# Patient Record
Sex: FEMALE | Race: WHITE | URBAN - METROPOLITAN AREA
[De-identification: names, ages, dates, MRNs, and addresses within clinical notes are randomized per-mention and may not be internally consistent; named-entity substitution may affect disease eponyms.]

---

## 2022-05-09 ENCOUNTER — APPOINTMENT (RX ONLY)
Dept: URBAN - METROPOLITAN AREA CLINIC 51 | Facility: CLINIC | Age: 22
Setting detail: DERMATOLOGY
End: 2022-05-09

## 2022-05-09 DIAGNOSIS — D22 MELANOCYTIC NEVI: ICD-10-CM | Status: STABLE

## 2022-05-09 DIAGNOSIS — L21.8 OTHER SEBORRHEIC DERMATITIS: ICD-10-CM | Status: STABLE

## 2022-05-09 DIAGNOSIS — L40.0 PSORIASIS VULGARIS: ICD-10-CM | Status: STABLE

## 2022-05-09 DIAGNOSIS — T07XXXA INSECT BITE, NONVENOMOUS, OF OTHER, MULTIPLE, AND UNSPECIFIED SITES, WITHOUT MENTION OF INFECTION: ICD-10-CM | Status: STABLE

## 2022-05-09 PROBLEM — D22.62 MELANOCYTIC NEVI OF LEFT UPPER LIMB, INCLUDING SHOULDER: Status: ACTIVE | Noted: 2022-05-09

## 2022-05-09 PROBLEM — L30.9 DERMATITIS, UNSPECIFIED: Status: ACTIVE | Noted: 2022-05-09

## 2022-05-09 PROBLEM — D22.61 MELANOCYTIC NEVI OF RIGHT UPPER LIMB, INCLUDING SHOULDER: Status: ACTIVE | Noted: 2022-05-09

## 2022-05-09 PROBLEM — S80.862A INSECT BITE (NONVENOMOUS), LEFT LOWER LEG, INITIAL ENCOUNTER: Status: ACTIVE | Noted: 2022-05-09

## 2022-05-09 PROCEDURE — 99203 OFFICE O/P NEW LOW 30 MIN: CPT

## 2022-05-09 PROCEDURE — ? TREATMENT REGIMEN

## 2022-05-09 PROCEDURE — ? ADDITIONAL NOTES

## 2022-05-09 PROCEDURE — ? COUNSELING

## 2022-05-09 ASSESSMENT — LOCATION DETAILED DESCRIPTION DERM
LOCATION DETAILED: LEFT ELBOW
LOCATION DETAILED: RIGHT POSTERIOR SHOULDER
LOCATION DETAILED: LEFT POSTERIOR SHOULDER
LOCATION DETAILED: RIGHT ELBOW
LOCATION DETAILED: LEFT PROXIMAL CALF
LOCATION DETAILED: HAIR

## 2022-05-09 ASSESSMENT — LOCATION ZONE DERM
LOCATION ZONE: SCALP
LOCATION ZONE: ARM
LOCATION ZONE: LEG

## 2022-05-09 ASSESSMENT — PGA PSORIASIS: PGA PSORIASIS 2020: ALMOST CLEAR

## 2022-05-09 ASSESSMENT — LOCATION SIMPLE DESCRIPTION DERM
LOCATION SIMPLE: RIGHT ELBOW
LOCATION SIMPLE: LEFT SHOULDER
LOCATION SIMPLE: RIGHT SHOULDER
LOCATION SIMPLE: LEFT CALF
LOCATION SIMPLE: HAIR
LOCATION SIMPLE: LEFT ELBOW

## 2022-05-09 ASSESSMENT — SEVERITY ASSESSMENT: HOW SEVERE IS THIS PATIENT'S CONDITION?: MILD

## 2022-05-09 NOTE — HPI: RASH
How Severe Is Your Rash?: mild
Is This A New Presentation, Or A Follow-Up?: Rash
Additional History: Patient states her mother has psoriasis

## 2022-05-09 NOTE — PROCEDURE: TREATMENT REGIMEN
Detail Level: Zone
Action 2: Continue
Samples Given: Eucerin rough & bumpy\\nCerave healing ointment

## 2022-05-09 NOTE — PROCEDURE: ADDITIONAL NOTES
Render Risk Assessment In Note?: no
Additional Notes: Recommended that patient come in when she is having a flare up. Patient has very little dry skin currently. She did show pictures that were a little worse than today, could be psoriasis. Her mom does have psoriasis. Advised her to follow up if she has a flare so that I can evaluate it.
Detail Level: Simple

## 2023-05-30 VITALS — HEIGHT: 64 IN | BODY MASS INDEX: 32.95 KG/M2 | WEIGHT: 193 LBS

## 2023-05-30 PROBLEM — O09.93 HIGH-RISK PREGNANCY IN THIRD TRIMESTER: Status: ACTIVE | Noted: 2023-05-30

## 2023-05-30 PROBLEM — J45.909 ASTHMA: Status: ACTIVE | Noted: 2023-01-09

## 2023-05-30 PROBLEM — O24.419 GESTATIONAL DIABETES MELLITUS (GDM) IN THIRD TRIMESTER: Status: ACTIVE | Noted: 2023-05-30

## 2023-05-30 PROBLEM — O99.213 OBESITY AFFECTING PREGNANCY IN THIRD TRIMESTER: Status: ACTIVE | Noted: 2023-05-30

## 2023-06-01 ENCOUNTER — HOSPITAL ENCOUNTER (EMERGENCY)
Age: 23
Discharge: HOME | End: 2023-06-01
Attending: STUDENT IN AN ORGANIZED HEALTH CARE EDUCATION/TRAINING PROGRAM | Admitting: OBSTETRICS & GYNECOLOGY
Payer: COMMERCIAL

## 2023-06-01 VITALS
BODY MASS INDEX: 33.12 KG/M2 | WEIGHT: 194 LBS | SYSTOLIC BLOOD PRESSURE: 120 MMHG | TEMPERATURE: 99 F | HEART RATE: 89 BPM | OXYGEN SATURATION: 97 % | HEIGHT: 64 IN | RESPIRATION RATE: 18 BRPM | DIASTOLIC BLOOD PRESSURE: 63 MMHG

## 2023-06-01 DIAGNOSIS — R00.2 PALPITATIONS: Primary | ICD-10-CM

## 2023-06-01 DIAGNOSIS — I49.3 PVC (PREMATURE VENTRICULAR CONTRACTION): ICD-10-CM

## 2023-06-01 LAB
ALBUMIN SERPL-MCNC: 2.5 G/DL (ref 3.5–5)
ALBUMIN/GLOB SERPL: 0.5 (ref 0.4–1.6)
ALP SERPL-CCNC: 121 U/L (ref 50–136)
ALT SERPL-CCNC: 29 U/L (ref 12–65)
ANION GAP SERPL CALC-SCNC: 8 MMOL/L (ref 2–11)
APPEARANCE UR: ABNORMAL
AST SERPL-CCNC: 23 U/L (ref 15–37)
BACTERIA URNS QL MICRO: ABNORMAL /HPF
BASOPHILS # BLD: 0 K/UL (ref 0–0.2)
BASOPHILS NFR BLD: 0 % (ref 0–2)
BILIRUB SERPL-MCNC: 0.2 MG/DL (ref 0.2–1.1)
BILIRUB UR QL: NEGATIVE
BUN SERPL-MCNC: 7 MG/DL (ref 6–23)
CALCIUM SERPL-MCNC: 8.7 MG/DL (ref 8.3–10.4)
CASTS URNS QL MICRO: ABNORMAL /LPF
CHLORIDE SERPL-SCNC: 107 MMOL/L (ref 101–110)
CO2 SERPL-SCNC: 24 MMOL/L (ref 21–32)
COLOR UR: ABNORMAL
CREAT SERPL-MCNC: 0.62 MG/DL (ref 0.6–1)
CREAT UR-MCNC: 77 MG/DL
DIFFERENTIAL METHOD BLD: ABNORMAL
EOSINOPHIL # BLD: 0.1 K/UL (ref 0–0.8)
EOSINOPHIL NFR BLD: 2 % (ref 0.5–7.8)
EPI CELLS #/AREA URNS HPF: ABNORMAL /HPF
ERYTHROCYTE [DISTWIDTH] IN BLOOD BY AUTOMATED COUNT: 12.6 % (ref 11.9–14.6)
GLOBULIN SER CALC-MCNC: 4.7 G/DL (ref 2.8–4.5)
GLUCOSE SERPL-MCNC: 95 MG/DL (ref 65–100)
GLUCOSE UR STRIP.AUTO-MCNC: NEGATIVE MG/DL
HCT VFR BLD AUTO: 31.4 % (ref 35.8–46.3)
HGB BLD-MCNC: 10.2 G/DL (ref 11.7–15.4)
HGB UR QL STRIP: NEGATIVE
IMM GRANULOCYTES # BLD AUTO: 0 K/UL (ref 0–0.5)
IMM GRANULOCYTES NFR BLD AUTO: 0 % (ref 0–5)
KETONES UR QL STRIP.AUTO: NEGATIVE MG/DL
LDH SERPL L TO P-CCNC: 191 U/L (ref 100–190)
LEUKOCYTE ESTERASE UR QL STRIP.AUTO: ABNORMAL
LYMPHOCYTES # BLD: 1.8 K/UL (ref 0.5–4.6)
LYMPHOCYTES NFR BLD: 23 % (ref 13–44)
MAGNESIUM SERPL-MCNC: 1.9 MG/DL (ref 1.8–2.4)
MCH RBC QN AUTO: 28.3 PG (ref 26.1–32.9)
MCHC RBC AUTO-ENTMCNC: 32.5 G/DL (ref 31.4–35)
MCV RBC AUTO: 87 FL (ref 82–102)
MONOCYTES # BLD: 0.5 K/UL (ref 0.1–1.3)
MONOCYTES NFR BLD: 6 % (ref 4–12)
NEUTS SEG # BLD: 5.4 K/UL (ref 1.7–8.2)
NEUTS SEG NFR BLD: 69 % (ref 43–78)
NITRITE UR QL STRIP.AUTO: NEGATIVE
NRBC # BLD: 0 K/UL (ref 0–0.2)
PH UR STRIP: 7.5 (ref 5–9)
PLATELET # BLD AUTO: 282 K/UL (ref 150–450)
PMV BLD AUTO: 10.1 FL (ref 9.4–12.3)
POTASSIUM SERPL-SCNC: 3.3 MMOL/L (ref 3.5–5.1)
PROT SERPL-MCNC: 7.2 G/DL (ref 6.3–8.2)
PROT UR STRIP-MCNC: NEGATIVE MG/DL
PROT UR-MCNC: 19 MG/DL
PROT/CREAT UR-RTO: 0.2
RBC # BLD AUTO: 3.61 M/UL (ref 4.05–5.2)
RBC #/AREA URNS HPF: ABNORMAL /HPF
SODIUM SERPL-SCNC: 139 MMOL/L (ref 133–143)
SP GR UR REFRACTOMETRY: 1.01 (ref 1–1.02)
TSH W FREE THYROID IF ABNORMAL: 0.86 UIU/ML (ref 0.36–3.74)
URATE SERPL-MCNC: 4.3 MG/DL (ref 2.6–6)
UROBILINOGEN UR QL STRIP.AUTO: 1 EU/DL (ref 0.2–1)
WBC # BLD AUTO: 7.9 K/UL (ref 4.3–11.1)
WBC URNS QL MICRO: ABNORMAL /HPF

## 2023-06-01 PROCEDURE — 84443 ASSAY THYROID STIM HORMONE: CPT

## 2023-06-01 PROCEDURE — 85025 COMPLETE CBC W/AUTO DIFF WBC: CPT

## 2023-06-01 PROCEDURE — 80053 COMPREHEN METABOLIC PANEL: CPT

## 2023-06-01 PROCEDURE — 59025 FETAL NON-STRESS TEST: CPT

## 2023-06-01 PROCEDURE — 99284 EMERGENCY DEPT VISIT MOD MDM: CPT

## 2023-06-01 PROCEDURE — 84156 ASSAY OF PROTEIN URINE: CPT

## 2023-06-01 PROCEDURE — 83735 ASSAY OF MAGNESIUM: CPT

## 2023-06-01 PROCEDURE — 82570 ASSAY OF URINE CREATININE: CPT

## 2023-06-01 PROCEDURE — 81001 URINALYSIS AUTO W/SCOPE: CPT

## 2023-06-01 PROCEDURE — 99283 EMERGENCY DEPT VISIT LOW MDM: CPT

## 2023-06-01 PROCEDURE — 93005 ELECTROCARDIOGRAM TRACING: CPT | Performed by: EMERGENCY MEDICINE

## 2023-06-01 PROCEDURE — 83615 LACTATE (LD) (LDH) ENZYME: CPT

## 2023-06-01 PROCEDURE — 84550 ASSAY OF BLOOD/URIC ACID: CPT

## 2023-06-01 ASSESSMENT — PAIN SCALES - GENERAL: PAINLEVEL_OUTOF10: 0

## 2023-06-01 ASSESSMENT — ENCOUNTER SYMPTOMS
CHOKING: 0
SHORTNESS OF BREATH: 0
CHEST TIGHTNESS: 0
COUGH: 0

## 2023-06-01 ASSESSMENT — PAIN - FUNCTIONAL ASSESSMENT: PAIN_FUNCTIONAL_ASSESSMENT: 0-10
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
## 2023-06-01 NOTE — ED PROVIDER NOTES
normal.         Thought Content: Thought content normal.        Procedures     Procedures    Orders Placed This Encounter   Procedures    Comprehensive Metabolic Panel    Protein / creatinine ratio, urine    Uric Acid    Lactate Dehydrogenase    Urinalysis w rflx microscopic    TSH with Reflex    Magnesium    CBC with Auto Differential    EKG 12 Lead        Medications - No data to display    Discharge Medication List as of 6/1/2023  8:00 PM           No past medical history on file. No past surgical history on file.      Social History     Socioeconomic History    Marital status: Single   Tobacco Use    Smoking status: Never    Smokeless tobacco: Never   Substance and Sexual Activity    Alcohol use: Not Currently    Drug use: Never        Discharge Medication List as of 6/1/2023  8:00 PM        CONTINUE these medications which have NOT CHANGED    Details   Prenatal Vit-Fe Fumarate-FA (PRENATAL 1+1 PO) PrenatalHistorical Med              Results for orders placed or performed during the hospital encounter of 06/01/23   Comprehensive Metabolic Panel   Result Value Ref Range    Sodium 139 133 - 143 mmol/L    Potassium 3.3 (L) 3.5 - 5.1 mmol/L    Chloride 107 101 - 110 mmol/L    CO2 24 21 - 32 mmol/L    Anion Gap 8 2 - 11 mmol/L    Glucose 95 65 - 100 mg/dL    BUN 7 6 - 23 MG/DL    Creatinine 0.62 0.6 - 1.0 MG/DL    Est, Glom Filt Rate >60 >60 ml/min/1.73m2    Calcium 8.7 8.3 - 10.4 MG/DL    Total Bilirubin 0.2 0.2 - 1.1 MG/DL    ALT 29 12 - 65 U/L    AST 23 15 - 37 U/L    Alk Phosphatase 121 50 - 136 U/L    Total Protein 7.2 6.3 - 8.2 g/dL    Albumin 2.5 (L) 3.5 - 5.0 g/dL    Globulin 4.7 (H) 2.8 - 4.5 g/dL    Albumin/Globulin Ratio 0.5 0.4 - 1.6     Protein / creatinine ratio, urine   Result Value Ref Range    Protein, Urine, Random 19 mg/dL    Creatinine, Ur 77.00 mg/dL    PROTEIN/CREAT RATIO URINE RAN 0.2     Uric Acid   Result Value Ref Range    Uric Acid 4.3 2.6 - 6.0 MG/DL   Lactate Dehydrogenase   Result

## 2023-06-01 NOTE — PROGRESS NOTES
Pt transferred to ED per MD order. Dr. Niesha Fletcher called ED triage and gave report. Transferred via wheelchair with ST Bean at side. Stable condition.
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
## 2023-06-02 LAB
EKG ATRIAL RATE: 71 BPM
EKG DIAGNOSIS: NORMAL
EKG P AXIS: 38 DEGREES
EKG P-R INTERVAL: 125 MS
EKG Q-T INTERVAL: 376 MS
EKG QRS DURATION: 114 MS
EKG QTC CALCULATION (BAZETT): 445 MS
EKG R AXIS: 84 DEGREES
EKG T AXIS: 35 DEGREES
EKG VENTRICULAR RATE: 84 BPM

## 2023-06-02 PROCEDURE — 93010 ELECTROCARDIOGRAM REPORT: CPT | Performed by: INTERNAL MEDICINE

## 2023-06-02 NOTE — ED NOTES
I have reviewed discharge instructions with the patient. The patient verbalized understanding. Patient left ED via Discharge Method: ambulatory to Home with self. Opportunity for questions and clarification provided. Patient given 0 scripts. To continue your aftercare when you leave the hospital, you may receive an automated call from our care team to check in on how you are doing. This is a free service and part of our promise to provide the best care and service to meet your aftercare needs.  If you have questions, or wish to unsubscribe from this service please call [phone]. Thank you for Choosing our [facility] Emergency Department.        Renae Méndez RN  06/01/23 2013

## 2023-06-05 ENCOUNTER — ROUTINE PRENATAL (OUTPATIENT)
Dept: OBGYN CLINIC | Age: 23
End: 2023-06-05
Payer: COMMERCIAL

## 2023-06-05 DIAGNOSIS — O99.213 OBESITY AFFECTING PREGNANCY IN THIRD TRIMESTER: ICD-10-CM

## 2023-06-05 DIAGNOSIS — O09.93 HIGH-RISK PREGNANCY IN THIRD TRIMESTER: Primary | ICD-10-CM

## 2023-06-05 DIAGNOSIS — O99.519 ASTHMA DURING PREGNANCY: ICD-10-CM

## 2023-06-05 DIAGNOSIS — Z3A.33 33 WEEKS GESTATION OF PREGNANCY: ICD-10-CM

## 2023-06-05 DIAGNOSIS — J45.909 ASTHMA DURING PREGNANCY: ICD-10-CM

## 2023-06-05 DIAGNOSIS — I49.9 MATERNAL ARRHYTHMIA COMPLICATING PREGNANCY IN THIRD TRIMESTER: ICD-10-CM

## 2023-06-05 DIAGNOSIS — O99.413 MATERNAL ARRHYTHMIA COMPLICATING PREGNANCY IN THIRD TRIMESTER: ICD-10-CM

## 2023-06-05 DIAGNOSIS — O24.419 GESTATIONAL DIABETES MELLITUS (GDM) IN THIRD TRIMESTER, GESTATIONAL DIABETES METHOD OF CONTROL UNSPECIFIED: ICD-10-CM

## 2023-06-05 PROCEDURE — 76811 OB US DETAILED SNGL FETUS: CPT | Performed by: OBSTETRICS & GYNECOLOGY

## 2023-06-05 PROCEDURE — 1036F TOBACCO NON-USER: CPT | Performed by: OBSTETRICS & GYNECOLOGY

## 2023-06-05 PROCEDURE — 99204 OFFICE O/P NEW MOD 45 MIN: CPT | Performed by: OBSTETRICS & GYNECOLOGY

## 2023-06-05 PROCEDURE — 96127 BRIEF EMOTIONAL/BEHAV ASSMT: CPT | Performed by: OBSTETRICS & GYNECOLOGY

## 2023-06-05 PROCEDURE — G8419 CALC BMI OUT NRM PARAM NOF/U: HCPCS | Performed by: OBSTETRICS & GYNECOLOGY

## 2023-06-05 PROCEDURE — 93325 DOPPLER ECHO COLOR FLOW MAPG: CPT | Performed by: OBSTETRICS & GYNECOLOGY

## 2023-06-05 PROCEDURE — 76819 FETAL BIOPHYS PROFIL W/O NST: CPT | Performed by: OBSTETRICS & GYNECOLOGY

## 2023-06-05 PROCEDURE — 76825 ECHO EXAM OF FETAL HEART: CPT | Performed by: OBSTETRICS & GYNECOLOGY

## 2023-06-05 PROCEDURE — G8427 DOCREV CUR MEDS BY ELIG CLIN: HCPCS | Performed by: OBSTETRICS & GYNECOLOGY

## 2023-06-05 NOTE — ASSESSMENT & PLAN NOTE
Gestational Diabetes Mellitus  Gestational DM develops during pregnancy in women whose pancreatic function is insufficient to overcome the insulin resistance associated with the pregnant state. Among the main consequences are increased risks of preeclampsia, macrosomia, and  delivery, and their associated morbidities. The risks of these outcomes increase as maternal fasting plasma glucose levels increase above 75 mg/dL. Patient counseled that insulin resistance will rise as pregnancy progresses. She was counseled re appropriate diet choices and activity. She has a moderate understanding and high desire to optimize her pregnancy's health. In addition, the diagnosis of gestational diabetes in pregnancy raises risk for maternal type 2 diabetes in future (up to 50% within 15 years) and cardiovascular disease. The best way to minimize these risks are to maintain diet changes and increase activity levels. Patients should establish care with a PCP for surveillance every 1-2 years to monitor for development of diabetes. This risk is modified by breast feeding for at least 6-12 months. Infants of pregnancies associated with gestational diabetes are at risk for  hypoglycemia due to increased insulin production to manage the glucose which crosses the placenta. In addition, they are at risk for obesity and altered glucose metabolism throughout their life. This risk is decreased with exposure to breast milk. In those affected by diabetes in pregnancy, consider initiation of  milk expression beginning at 37 weeks. Antepartum Recommendations:   Continue to monitor glycemic control 2 times daily- fasting and 1 hour after meals rotating through all meals over 3 days. , 1 hour postprandial goal <140. and Fasting <95   Send glucose logs to [facility] and primary OB each week. · Patient will return to [facility] for growth in ~3 weeks.    · Will determine need for  testing in 3rd trimester

## 2023-06-05 NOTE — ASSESSMENT & PLAN NOTE
Asthma in pregnancy can be potentially life-threatening to mother and fetus. Often, asthma will worsen in early third trimester. Recommend close monitoring and preventative therapy and aggressive treatment of exacerbations. Patients with mild or well-controlled asthma will use an inhaled Beta-agonist (Albuterol type inhaler) prn or every 4-6 hours to control asthma. If patient requires use of rescue inhaler more than 2 times per week, worsening pulmonary function should be treated with the addition of an inhaled corticosteroid (ex. Flovent) BID. For severe asthma or significant worsening of condition on the first 2 combined medications, patient should be assessed by her primary care physician. However, oral Prednisone 60 mgs daily for 1 week followed by a 10 day taper may be used. Recommendation to decrease asthma triggers      testing twice weekly is recommended for severe asthma beginning at 32 weeks with growth assessment each month. Avoid hemabate for treatment of uterine atony/pp hemorrhage.

## 2023-06-05 NOTE — PROGRESS NOTES
MFM Consultation    Yesica Hernandez (: 2000) is a 21 y.o.  at 33w0d with 2023, by Other B[facility]. 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 Scheduled to see Primary [facility]) on 2023. GDM:  Patient with recent diagnosis of gestational diabetes. Full details of current status in A/P section of problem list. Failed 2hrGTT by <10pt at 1hr clarence. Vast majority of values at goal. Not limiting her food intake. No HAs, edema. Denies preeclamptic symptoms. Reports good fetal movement. No bleeding, LOF, cramping, ctxs, or vaginal pressure. Pt was seen by ED for palpitations on 2023, EKG showed PVC's. Continues to have occasional PVC's, reported them last night. History reviewed and updated as needed. Review of Systems - per HPI; otherwise unremarkable. Exam:     Vitals:    23 1444   Weight: 193 lb (87.5 kg)   Height: 5' 4\" (1.626 m)     Recent Labs Reviewed. Please see formal ultrasound report under imaging tab. ASSESSMENT/PLAN:  Patient Active Problem List    Diagnosis Date Noted    High-risk pregnancy in third trimester 2023     Priority: High     Overview Note:     Neg carrier screen    2023 at [facility]: Appropriate growth; AC 31%, Overall 38%, MITUL 15.5 cm, UA Dopplers WNL, BPP . No follow-up with [facility], refer back PRN  See GDM Overview for log review and recommendations. DM managed remotely by CDE with [facility]. Growth with primary OB at 36 weeks   expression starting at 37 weeks - info provided today. Delivery by 41 weeks.       Gestational diabetes mellitus (GDM) in third trimester 2023     Priority: High     Assessment & Plan Note:     Gestational Diabetes Mellitus  Gestational DM develops during pregnancy in women whose pancreatic function is insufficient to

## 2023-06-05 NOTE — PATIENT INSTRUCTIONS
Please remember to send your glucose log weekly to [facility] either through [facility] or to [email or web address]. It helps us better manage your diabetes and overall care for you and your baby! Postpartum Care for Diabetics    Be sure you have a postpartum appointment with either your previously established endocrinologist or primary care provider scheduled at least 3-6 months after you deliver to continue your care. If you do not have a primary care provider, please visit [email or web address] for a list of providers within [facility]. It is important for you to continue your diabetic care and get your A1c drawn annually.

## 2023-06-20 ENCOUNTER — TELEPHONE (OUTPATIENT)
Dept: OBGYN CLINIC | Age: 23
End: 2023-06-20
Payer: COMMERCIAL

## 2023-06-20 DIAGNOSIS — O24.410 DIET CONTROLLED GESTATIONAL DIABETES MELLITUS (GDM) IN THIRD TRIMESTER: Primary | ICD-10-CM

## 2023-06-20 PROCEDURE — 99091 COLLJ & INTERPJ DATA EA 30 D: CPT | Performed by: OBSTETRICS & GYNECOLOGY

## 2023-06-20 NOTE — TELEPHONE ENCOUNTER
Roxana Merlos for follow-up for diabetes management in pregnancy. BG log reviewed from 6/4 to  6/20, BGs consistently in an acceptable range, recommend continued BID testing - fasting plus 1 rotating meal daily. Goals for glycemic control in pregnancy:    Maintain A1c of 6% during pregnancy, to be checked each trimester. Needs to be drawn at next OB visit    For CGM users, in time range of 70% or greater. N/A    For FSBG users, glycemic control of FBG's < 95 and PP's < 120. In progress, improving    30 minutes spent on review of diabetic education by FREDY. I have reviewed the patient's diabetic assessment/plan by Bessie Yi, MSN Davie Gitelman. I agree with treatment/plan as above. Sarah Sanchez MD    It is the standard of care that patients with diabetes in pregnancy have glycemic control monitored weekly. It helps us better manage your gestational diabetes and overall care for you and your baby! This may be done in person with the diabetic educator/physician or may occur virtually via email/[email or web address]. Please remember to send your glucose log weekly to [facility] either through [facility] or [email or web address] may receive a bill from [address] [place] [address] for this service.

## 2023-07-26 ENCOUNTER — HOSPITAL ENCOUNTER (INPATIENT)
Age: 23
LOS: 3 days | Discharge: HOME | End: 2023-07-29
Attending: OBSTETRICS & GYNECOLOGY | Admitting: OBSTETRICS & GYNECOLOGY
Payer: COMMERCIAL

## 2023-07-26 PROBLEM — Z34.90 ENCOUNTER FOR INDUCTION OF LABOR: Status: ACTIVE | Noted: 2023-07-26

## 2023-07-26 LAB
ABO + RH BLD: NORMAL
BASOPHILS # BLD: 0 K/UL (ref 0–0.2)
BASOPHILS NFR BLD: 0 % (ref 0–2)
BLOOD GROUP ANTIBODIES SERPL: NORMAL
DIFFERENTIAL METHOD BLD: ABNORMAL
EOSINOPHIL # BLD: 0.1 K/UL (ref 0–0.8)
EOSINOPHIL NFR BLD: 1 % (ref 0.5–7.8)
ERYTHROCYTE [DISTWIDTH] IN BLOOD BY AUTOMATED COUNT: 15.6 % (ref 11.9–14.6)
GLUCOSE BLD STRIP.AUTO-MCNC: 91 MG/DL (ref 65–100)
HCT VFR BLD AUTO: 30.6 % (ref 35.8–46.3)
HGB BLD-MCNC: 9.7 G/DL (ref 11.7–15.4)
IMM GRANULOCYTES # BLD AUTO: 0.1 K/UL (ref 0–0.5)
IMM GRANULOCYTES NFR BLD AUTO: 1 % (ref 0–5)
LYMPHOCYTES # BLD: 2 K/UL (ref 0.5–4.6)
LYMPHOCYTES NFR BLD: 19 % (ref 13–44)
MCH RBC QN AUTO: 24.9 PG (ref 26.1–32.9)
MCHC RBC AUTO-ENTMCNC: 31.7 G/DL (ref 31.4–35)
MCV RBC AUTO: 78.5 FL (ref 82–102)
MONOCYTES # BLD: 0.6 K/UL (ref 0.1–1.3)
MONOCYTES NFR BLD: 6 % (ref 4–12)
NEUTS SEG # BLD: 7.6 K/UL (ref 1.7–8.2)
NEUTS SEG NFR BLD: 73 % (ref 43–78)
NRBC # BLD: 0 K/UL (ref 0–0.2)
PLATELET # BLD AUTO: 266 K/UL (ref 150–450)
PMV BLD AUTO: 11.1 FL (ref 9.4–12.3)
RBC # BLD AUTO: 3.9 M/UL (ref 4.05–5.2)
SERVICE CMNT-IMP: NORMAL
SPECIMEN EXP DATE BLD: NORMAL
WBC # BLD AUTO: 10.3 K/UL (ref 4.3–11.1)

## 2023-07-26 PROCEDURE — 85025 COMPLETE CBC W/AUTO DIFF WBC: CPT

## 2023-07-26 PROCEDURE — 86901 BLOOD TYPING SEROLOGIC RH(D): CPT

## 2023-07-26 PROCEDURE — 6370000000 HC RX 637 (ALT 250 FOR IP): Performed by: OBSTETRICS & GYNECOLOGY

## 2023-07-26 PROCEDURE — 4A1HXCZ MONITORING OF PRODUCTS OF CONCEPTION, CARDIAC RATE, EXTERNAL APPROACH: ICD-10-PCS | Performed by: STUDENT IN AN ORGANIZED HEALTH CARE EDUCATION/TRAINING PROGRAM

## 2023-07-26 PROCEDURE — 2580000003 HC RX 258: Performed by: OBSTETRICS & GYNECOLOGY

## 2023-07-26 PROCEDURE — 82962 GLUCOSE BLOOD TEST: CPT

## 2023-07-26 PROCEDURE — 86850 RBC ANTIBODY SCREEN: CPT

## 2023-07-26 PROCEDURE — 6360000002 HC RX W HCPCS: Performed by: OBSTETRICS & GYNECOLOGY

## 2023-07-26 PROCEDURE — 1100000000 HC RM PRIVATE

## 2023-07-26 PROCEDURE — 86900 BLOOD TYPING SEROLOGIC ABO: CPT

## 2023-07-26 PROCEDURE — 3E0DXGC INTRODUCTION OF OTHER THERAPEUTIC SUBSTANCE INTO MOUTH AND PHARYNX, EXTERNAL APPROACH: ICD-10-PCS | Performed by: STUDENT IN AN ORGANIZED HEALTH CARE EDUCATION/TRAINING PROGRAM

## 2023-07-26 PROCEDURE — 36415 COLL VENOUS BLD VENIPUNCTURE: CPT

## 2023-07-26 RX ORDER — SODIUM CHLORIDE, SODIUM LACTATE, POTASSIUM CHLORIDE, CALCIUM CHLORIDE 600; 310; 30; 20 MG/100ML; MG/100ML; MG/100ML; MG/100ML
INJECTION, SOLUTION INTRAVENOUS CONTINUOUS
Status: DISCONTINUED | OUTPATIENT
Start: 2023-07-26 | End: 2023-07-27

## 2023-07-26 RX ORDER — MORPHINE SULFATE 2 MG/ML
2 INJECTION, SOLUTION INTRAMUSCULAR; INTRAVENOUS EVERY 4 HOURS PRN
Status: DISCONTINUED | OUTPATIENT
Start: 2023-07-26 | End: 2023-07-27

## 2023-07-26 RX ORDER — FAMOTIDINE 20 MG/1
20 TABLET, FILM COATED ORAL 2 TIMES DAILY
Status: DISCONTINUED | OUTPATIENT
Start: 2023-07-26 | End: 2023-07-27

## 2023-07-26 RX ORDER — ONDANSETRON 2 MG/ML
4 INJECTION INTRAMUSCULAR; INTRAVENOUS EVERY 6 HOURS PRN
Status: DISCONTINUED | OUTPATIENT
Start: 2023-07-26 | End: 2023-07-27

## 2023-07-26 RX ORDER — SODIUM CHLORIDE, SODIUM LACTATE, POTASSIUM CHLORIDE, AND CALCIUM CHLORIDE .6; .31; .03; .02 G/100ML; G/100ML; G/100ML; G/100ML
500 INJECTION, SOLUTION INTRAVENOUS PRN
Status: DISCONTINUED | OUTPATIENT
Start: 2023-07-26 | End: 2023-07-27

## 2023-07-26 RX ORDER — ZOLPIDEM TARTRATE 5 MG/1
5 TABLET ORAL NIGHTLY PRN
Status: DISCONTINUED | OUTPATIENT
Start: 2023-07-26 | End: 2023-07-29 | Stop reason: HOSPADM

## 2023-07-26 RX ORDER — SODIUM CHLORIDE 0.9 % (FLUSH) 0.9 %
5-40 SYRINGE (ML) INJECTION EVERY 12 HOURS SCHEDULED
Status: DISCONTINUED | OUTPATIENT
Start: 2023-07-26 | End: 2023-07-27

## 2023-07-26 RX ORDER — SODIUM CHLORIDE 0.9 % (FLUSH) 0.9 %
5-40 SYRINGE (ML) INJECTION PRN
Status: DISCONTINUED | OUTPATIENT
Start: 2023-07-26 | End: 2023-07-27

## 2023-07-26 RX ORDER — SODIUM CHLORIDE 9 MG/ML
25 INJECTION, SOLUTION INTRAVENOUS PRN
Status: DISCONTINUED | OUTPATIENT
Start: 2023-07-26 | End: 2023-07-27

## 2023-07-26 RX ORDER — SODIUM CHLORIDE, SODIUM LACTATE, POTASSIUM CHLORIDE, AND CALCIUM CHLORIDE .6; .31; .03; .02 G/100ML; G/100ML; G/100ML; G/100ML
1000 INJECTION, SOLUTION INTRAVENOUS PRN
Status: DISCONTINUED | OUTPATIENT
Start: 2023-07-26 | End: 2023-07-27

## 2023-07-26 RX ORDER — ACETAMINOPHEN 325 MG/1
650 TABLET ORAL EVERY 4 HOURS PRN
Status: DISCONTINUED | OUTPATIENT
Start: 2023-07-26 | End: 2023-07-27

## 2023-07-26 RX ADMIN — SODIUM CHLORIDE, POTASSIUM CHLORIDE, SODIUM LACTATE AND CALCIUM CHLORIDE: 600; 310; 30; 20 INJECTION, SOLUTION INTRAVENOUS at 20:31

## 2023-07-26 RX ADMIN — SODIUM CHLORIDE 5 MILLION UNITS: 900 INJECTION INTRAVENOUS at 20:30

## 2023-07-26 RX ADMIN — Medication 50 MCG: at 20:09

## 2023-07-27 ENCOUNTER — ANESTHESIA EVENT (OUTPATIENT)
Dept: LABOR AND DELIVERY | Age: 23
End: 2023-07-27
Payer: COMMERCIAL

## 2023-07-27 ENCOUNTER — ANESTHESIA (OUTPATIENT)
Dept: LABOR AND DELIVERY | Age: 23
End: 2023-07-27
Payer: COMMERCIAL

## 2023-07-27 LAB
GLUCOSE BLD STRIP.AUTO-MCNC: 99 MG/DL (ref 65–100)
SERVICE CMNT-IMP: NORMAL

## 2023-07-27 PROCEDURE — 1100000000 HC RM PRIVATE

## 2023-07-27 PROCEDURE — 3700000025 EPIDURAL BLOCK: Performed by: ANESTHESIOLOGY

## 2023-07-27 PROCEDURE — 6360000002 HC RX W HCPCS: Performed by: OBSTETRICS & GYNECOLOGY

## 2023-07-27 PROCEDURE — 51701 INSERT BLADDER CATHETER: CPT

## 2023-07-27 PROCEDURE — 6360000002 HC RX W HCPCS: Performed by: STUDENT IN AN ORGANIZED HEALTH CARE EDUCATION/TRAINING PROGRAM

## 2023-07-27 PROCEDURE — 7100000010 HC PHASE II RECOVERY - FIRST 15 MIN

## 2023-07-27 PROCEDURE — 82962 GLUCOSE BLOOD TEST: CPT

## 2023-07-27 PROCEDURE — 7220000101 HC DELIVERY VAGINAL/SINGLE

## 2023-07-27 PROCEDURE — 6370000000 HC RX 637 (ALT 250 FOR IP): Performed by: OBSTETRICS & GYNECOLOGY

## 2023-07-27 PROCEDURE — 7210000100 HC LABOR FEE PER 1 HR

## 2023-07-27 PROCEDURE — 0UQMXZZ REPAIR VULVA, EXTERNAL APPROACH: ICD-10-PCS | Performed by: STUDENT IN AN ORGANIZED HEALTH CARE EDUCATION/TRAINING PROGRAM

## 2023-07-27 PROCEDURE — 0KQM0ZZ REPAIR PERINEUM MUSCLE, OPEN APPROACH: ICD-10-PCS | Performed by: STUDENT IN AN ORGANIZED HEALTH CARE EDUCATION/TRAINING PROGRAM

## 2023-07-27 PROCEDURE — 6360000002 HC RX W HCPCS: Performed by: ANESTHESIOLOGY

## 2023-07-27 PROCEDURE — 7100000011 HC PHASE II RECOVERY - ADDTL 15 MIN

## 2023-07-27 PROCEDURE — 2580000003 HC RX 258: Performed by: STUDENT IN AN ORGANIZED HEALTH CARE EDUCATION/TRAINING PROGRAM

## 2023-07-27 PROCEDURE — 2500000003 HC RX 250 WO HCPCS: Performed by: ANESTHESIOLOGY

## 2023-07-27 PROCEDURE — 6370000000 HC RX 637 (ALT 250 FOR IP): Performed by: STUDENT IN AN ORGANIZED HEALTH CARE EDUCATION/TRAINING PROGRAM

## 2023-07-27 PROCEDURE — 2580000003 HC RX 258: Performed by: OBSTETRICS & GYNECOLOGY

## 2023-07-27 RX ORDER — FAMOTIDINE 20 MG/1
20 TABLET, FILM COATED ORAL 2 TIMES DAILY PRN
Status: DISCONTINUED | OUTPATIENT
Start: 2023-07-27 | End: 2023-07-29 | Stop reason: HOSPADM

## 2023-07-27 RX ORDER — LIDOCAINE HYDROCHLORIDE AND EPINEPHRINE 15; 5 MG/ML; UG/ML
INJECTION, SOLUTION EPIDURAL PRN
Status: DISCONTINUED | OUTPATIENT
Start: 2023-07-27 | End: 2023-07-27 | Stop reason: SDUPTHER

## 2023-07-27 RX ORDER — ONDANSETRON 8 MG/1
8 TABLET, ORALLY DISINTEGRATING ORAL EVERY 8 HOURS PRN
Status: DISCONTINUED | OUTPATIENT
Start: 2023-07-27 | End: 2023-07-29 | Stop reason: HOSPADM

## 2023-07-27 RX ORDER — OXYCODONE HYDROCHLORIDE 5 MG/1
10 TABLET ORAL EVERY 4 HOURS PRN
Status: DISCONTINUED | OUTPATIENT
Start: 2023-07-27 | End: 2023-07-29 | Stop reason: HOSPADM

## 2023-07-27 RX ORDER — SODIUM CHLORIDE 0.9 % (FLUSH) 0.9 %
5-40 SYRINGE (ML) INJECTION PRN
Status: DISCONTINUED | OUTPATIENT
Start: 2023-07-27 | End: 2023-07-29 | Stop reason: HOSPADM

## 2023-07-27 RX ORDER — SODIUM CHLORIDE 0.9 % (FLUSH) 0.9 %
5-40 SYRINGE (ML) INJECTION EVERY 12 HOURS SCHEDULED
Status: DISCONTINUED | OUTPATIENT
Start: 2023-07-27 | End: 2023-07-27

## 2023-07-27 RX ORDER — LANOLIN
CREAM (ML) TOPICAL PRN
Status: DISCONTINUED | OUTPATIENT
Start: 2023-07-27 | End: 2023-07-29 | Stop reason: HOSPADM

## 2023-07-27 RX ORDER — FAMOTIDINE 20 MG/1
20 TABLET, FILM COATED ORAL 2 TIMES DAILY
Status: DISCONTINUED | OUTPATIENT
Start: 2023-07-27 | End: 2023-07-29 | Stop reason: HOSPADM

## 2023-07-27 RX ORDER — IBUPROFEN 800 MG/1
800 TABLET ORAL EVERY 6 HOURS
Status: DISCONTINUED | OUTPATIENT
Start: 2023-07-27 | End: 2023-07-29 | Stop reason: HOSPADM

## 2023-07-27 RX ORDER — OXYCODONE HYDROCHLORIDE 5 MG/1
5 TABLET ORAL EVERY 4 HOURS PRN
Status: DISCONTINUED | OUTPATIENT
Start: 2023-07-27 | End: 2023-07-29 | Stop reason: HOSPADM

## 2023-07-27 RX ORDER — POLYETHYLENE GLYCOL 3350 17 G/17G
17 POWDER, FOR SOLUTION ORAL DAILY
Status: DISCONTINUED | OUTPATIENT
Start: 2023-07-28 | End: 2023-07-29 | Stop reason: HOSPADM

## 2023-07-27 RX ORDER — ACETAMINOPHEN 500 MG
1000 TABLET ORAL EVERY 6 HOURS
Status: DISCONTINUED | OUTPATIENT
Start: 2023-07-27 | End: 2023-07-29 | Stop reason: HOSPADM

## 2023-07-27 RX ORDER — ONDANSETRON 2 MG/ML
4 INJECTION INTRAMUSCULAR; INTRAVENOUS EVERY 6 HOURS PRN
Status: DISCONTINUED | OUTPATIENT
Start: 2023-07-27 | End: 2023-07-29 | Stop reason: HOSPADM

## 2023-07-27 RX ORDER — SODIUM CHLORIDE, SODIUM LACTATE, POTASSIUM CHLORIDE, CALCIUM CHLORIDE 600; 310; 30; 20 MG/100ML; MG/100ML; MG/100ML; MG/100ML
INJECTION, SOLUTION INTRAVENOUS CONTINUOUS
Status: DISCONTINUED | OUTPATIENT
Start: 2023-07-27 | End: 2023-07-29 | Stop reason: HOSPADM

## 2023-07-27 RX ORDER — SODIUM CHLORIDE 0.9 % (FLUSH) 0.9 %
5-40 SYRINGE (ML) INJECTION PRN
Status: DISCONTINUED | OUTPATIENT
Start: 2023-07-27 | End: 2023-07-27

## 2023-07-27 RX ORDER — DOCUSATE SODIUM 100 MG/1
100 CAPSULE, LIQUID FILLED ORAL 2 TIMES DAILY PRN
Status: DISCONTINUED | OUTPATIENT
Start: 2023-07-27 | End: 2023-07-29 | Stop reason: HOSPADM

## 2023-07-27 RX ORDER — LIDOCAINE HYDROCHLORIDE 10 MG/ML
1 INJECTION, SOLUTION INFILTRATION; PERINEURAL
Status: DISCONTINUED | OUTPATIENT
Start: 2023-07-27 | End: 2023-07-27

## 2023-07-27 RX ORDER — SODIUM CHLORIDE 9 MG/ML
INJECTION, SOLUTION INTRAVENOUS PRN
Status: DISCONTINUED | OUTPATIENT
Start: 2023-07-27 | End: 2023-07-27

## 2023-07-27 RX ORDER — ROPIVACAINE HYDROCHLORIDE 2 MG/ML
INJECTION, SOLUTION EPIDURAL; INFILTRATION; PERINEURAL PRN
Status: DISCONTINUED | OUTPATIENT
Start: 2023-07-27 | End: 2023-07-27 | Stop reason: SDUPTHER

## 2023-07-27 RX ORDER — FAMOTIDINE 20 MG/1
20 TABLET, FILM COATED ORAL ONCE
Status: DISCONTINUED | OUTPATIENT
Start: 2023-07-27 | End: 2023-07-27

## 2023-07-27 RX ORDER — SIMETHICONE 80 MG
80 TABLET,CHEWABLE ORAL EVERY 6 HOURS PRN
Status: DISCONTINUED | OUTPATIENT
Start: 2023-07-27 | End: 2023-07-29 | Stop reason: HOSPADM

## 2023-07-27 RX ORDER — SODIUM CHLORIDE 9 MG/ML
INJECTION, SOLUTION INTRAVENOUS PRN
Status: DISCONTINUED | OUTPATIENT
Start: 2023-07-27 | End: 2023-07-29 | Stop reason: HOSPADM

## 2023-07-27 RX ADMIN — OXYTOCIN 2 MILLI-UNITS/MIN: 10 INJECTION INTRAVENOUS at 12:19

## 2023-07-27 RX ADMIN — ONDANSETRON 4 MG: 2 INJECTION INTRAMUSCULAR; INTRAVENOUS at 14:20

## 2023-07-27 RX ADMIN — SODIUM CHLORIDE, POTASSIUM CHLORIDE, SODIUM LACTATE AND CALCIUM CHLORIDE: 600; 310; 30; 20 INJECTION, SOLUTION INTRAVENOUS at 08:00

## 2023-07-27 RX ADMIN — LIDOCAINE HYDROCHLORIDE,EPINEPHRINE BITARTRATE 3.5 ML: 15; .005 INJECTION, SOLUTION EPIDURAL; INFILTRATION; INTRACAUDAL; PERINEURAL at 10:38

## 2023-07-27 RX ADMIN — SODIUM CHLORIDE, PRESERVATIVE FREE 10 ML: 5 INJECTION INTRAVENOUS at 08:00

## 2023-07-27 RX ADMIN — Medication 2.5 MILLION UNITS: at 04:08

## 2023-07-27 RX ADMIN — WITCH HAZEL 1 EACH: 500 SOLUTION RECTAL; TOPICAL at 23:05

## 2023-07-27 RX ADMIN — SODIUM CHLORIDE, POTASSIUM CHLORIDE, SODIUM LACTATE AND CALCIUM CHLORIDE 1000 ML: 600; 310; 30; 20 INJECTION, SOLUTION INTRAVENOUS at 10:14

## 2023-07-27 RX ADMIN — Medication 2.5 MILLION UNITS: at 16:30

## 2023-07-27 RX ADMIN — Medication 50 MCG: at 04:08

## 2023-07-27 RX ADMIN — ACETAMINOPHEN 1000 MG: 500 TABLET, FILM COATED ORAL at 23:04

## 2023-07-27 RX ADMIN — SODIUM CHLORIDE, POTASSIUM CHLORIDE, SODIUM LACTATE AND CALCIUM CHLORIDE: 600; 310; 30; 20 INJECTION, SOLUTION INTRAVENOUS at 14:20

## 2023-07-27 RX ADMIN — Medication 50 MCG: at 08:00

## 2023-07-27 RX ADMIN — Medication 50 MCG: at 00:10

## 2023-07-27 RX ADMIN — ROPIVACAINE HYDROCHLORIDE 4 ML: 2 INJECTION, SOLUTION EPIDURAL; INFILTRATION; PERINEURAL at 10:41

## 2023-07-27 RX ADMIN — Medication 2.5 MILLION UNITS: at 00:10

## 2023-07-27 RX ADMIN — ROPIVACAINE HYDROCHLORIDE 4 ML: 2 INJECTION, SOLUTION EPIDURAL; INFILTRATION; PERINEURAL at 10:43

## 2023-07-27 RX ADMIN — ROPIVACAINE HYDROCHLORIDE 8 ML/HR: 2 INJECTION, SOLUTION EPIDURAL; INFILTRATION; PERINEURAL at 10:45

## 2023-07-27 RX ADMIN — Medication 2.5 MILLION UNITS: at 08:00

## 2023-07-27 RX ADMIN — Medication 2.5 MILLION UNITS: at 12:15

## 2023-07-27 RX ADMIN — DOCUSATE SODIUM 100 MG: 100 CAPSULE, LIQUID FILLED ORAL at 23:05

## 2023-07-27 ASSESSMENT — PAIN SCALES - GENERAL: PAINLEVEL_OUTOF10: 2

## 2023-07-27 ASSESSMENT — PAIN DESCRIPTION - FREQUENCY: FREQUENCY: INTERMITTENT

## 2023-07-27 ASSESSMENT — PAIN DESCRIPTION - LOCATION: LOCATION: ABDOMEN

## 2023-07-27 ASSESSMENT — PAIN DESCRIPTION - DESCRIPTORS: DESCRIPTORS: CRAMPING

## 2023-07-27 NOTE — PROGRESS NOTES
Naty Dickerson and CRNA at bedside at [phone]. Assisted pt to sitting up on bedside at 1031. Timeout completed at [phone] with MD, SALAS and myself at bedside. Test dose given at 1037. Negative reaction. Dose given at 1043. Pt assisted to lying back in left tilt position. See anesthesia record for details. See vital sign flow sheet for BP. Tolerated procedure well.       Dr Pawel Dickerson left room: [phone] left room: 1050

## 2023-07-27 NOTE — PROGRESS NOTES
Dr Majo Turner given update on pt status. Pt request epidural.  SVE 3/80/-1. MD states ok for epidural. IV bolus infusing.

## 2023-07-27 NOTE — PROGRESS NOTES
1200 BENJAMIN Murphy RN assumed pt. Care at this time. [phone] RN to bedside. Sterile straight cath per RN. 500 cc clear yellow urine drained. SVE per RN 4/80/-2. SROM noted. Clear fluids noted. Pt. Repositioned to right side with peanut ball. Telemetry monitor not tracing well. EFM and toco applied. Dr. Teresa Ureña called per RN. Updated on pt. Status. Orders for Pitocin per protocol. Dr. Teresa Ureña does not want POC glucose monitored at this time. 1451 Sterile straight cath per RN. 500 cc clear yellow urine drained SVE per RN. 7/100/-2. Pt. Repositioned to left side with peanut ball. 1628 Dr. Teresa Ureña called in. Updated on pt. Status. No new orders received. 1700 SVE per RN 9/100/0. Pt. Repositioned to high thurman's.  1750 Sterile straight cath per RN. 150 cc clear yellow urine drained. SVE per RN 10/100/+1. Pt. Not feeling pressure at this time.

## 2023-07-27 NOTE — PLAN OF CARE
Problem: Vaginal Birth or  Section  Goal: Fetal and maternal status remain reassuring during the birth process  Description:  Birth OB-Pregnancy care plan goal which identifies if the fetal and maternal status remain reassuring during the birth process  2023 by Donald Mary RN  Outcome: Progressing  Flowsheets (Taken 2023)  Fetal and Maternal Status Remain Reassuring During the Birth Process:   Monitor vital signs   Monitor fetal heart rate   Monitor uterine activity   Monitor labor progression (Vaginal delivery)  2023 by Raissa Brandt RN  Outcome: Progressing     Problem: Pain  Goal: Verbalizes/displays adequate comfort level or baseline comfort level  2023 by Donald Mary RN  Outcome: Progressing  2023 by Raissa Brandt RN  Outcome: Progressing     Problem: Infection - Adult  Goal: Absence of infection at discharge  Recent Flowsheet Documentation  Taken 2023 by Donald Mary RN  Absence of infection at discharge:   Assess and monitor for signs and symptoms of infection   Monitor lab/diagnostic results   Monitor all insertion sites i.e., indwelling lines, tubes and drains   Administer medications as ordered   Instruct and encourage patient and family to use good hand hygiene technique  2023 by Raissa Brandt RN  Outcome: Progressing  Goal: Absence of infection during hospitalization  Recent Flowsheet Documentation  Taken 2023 by Donald Mary RN  Absence of infection during hospitalization:   Assess and monitor for signs and symptoms of infection   Monitor lab/diagnostic results   Monitor all insertion sites i.e., indwelling lines, tubes and drains   Administer medications as ordered   Instruct and encourage patient and family to use good hand hygiene technique  2023 by Raissa Brandt RN  Outcome: Progressing  Goal: Absence of fever/infection during anticipated neutropenic period  2023 by Rosa Gordon YO Thompson  Outcome: Progressing     Problem: Safety - Adult  Goal: Free from fall injury  7/27/2023 0725 by Prince Grace RN  Outcome: Progressing  Flowsheets (Taken 7/27/2023 0719)  Free From Fall Injury:   Instruct family/caregiver on patient safety   Based on caregiver fall risk screen, instruct family/caregiver to ask for assistance with transferring infant if caregiver noted to have fall risk factors  7/26/2023 1912 by Karla Davies RN  Outcome: Progressing     Problem: Discharge Planning  Goal: Discharge to home or other facility with appropriate resources  Outcome: Progressing  Flowsheets  Taken 7/27/2023 0719  Discharge to home or other facility with appropriate resources:   Identify barriers to discharge with patient and caregiver   Arrange for needed discharge resources and transportation as appropriate   Identify discharge learning needs (meds, wound care, etc)   Refer to discharge planning if patient needs post-hospital services based on physician order or complex needs related to functional status, cognitive ability or social support system  Taken 7/27/2023 0717  Discharge to home or other facility with appropriate resources:   Identify barriers to discharge with patient and caregiver   Arrange for needed discharge resources and transportation as appropriate   Identify discharge learning needs (meds, wound care, etc)   Arrange for interpreters to assist at discharge as needed   Refer to discharge planning if patient needs post-hospital services based on physician order or complex needs related to functional status, cognitive ability or social support system     Problem: Chronic Conditions and Co-morbidities  Goal: Patient's chronic conditions and co-morbidity symptoms are monitored and maintained or improved  7/27/2023 0725 by Prince Grace RN  Outcome: Progressing  Flowsheets (Taken 7/27/2023 0719)  Care Plan - Patient's Chronic Conditions and Co-Morbidity Symptoms are Monitored and Maintained

## 2023-07-27 NOTE — ANESTHESIA PROCEDURE NOTES
Epidural Block    Patient location during procedure: OB  Start time: 7/27/2023 10:32 AM  End time: 7/27/2023 10:41 AM  Reason for block: labor epidural  Staffing  Performed: anesthesiologist   Anesthesiologist: Mariely Henriquez MD  Epidural  Patient position: sitting  Prep: ChloraPrep and site prepped and draped  Patient monitoring: continuous pulse ox and frequent blood pressure checks  Approach: midline  Location: L3-4  Injection technique: JUSTEN saline  Provider prep: mask and sterile gloves  Needle  Needle type: Tuohy   Needle gauge: 17 G  Needle length: 3.5 in  Needle insertion depth: 6 cm  Catheter type: end hole  Catheter size: 19 G  Catheter at skin depth: 11 cm  Test dose: negativeCatheter Secured: tegaderm and tape  Assessment  Hemodynamics: stable  Attempts: 1  Outcomes: patient tolerated procedure well  Additional Notes  Test dose with Lido 1.5% with epi - 3.5 ml  Preanesthetic Checklist  Completed: patient identified, IV checked, risks and benefits discussed, equipment checked, pre-op evaluation, timeout performed, anesthesia consent given, oxygen available and monitors applied/VS acknowledged

## 2023-07-28 PROCEDURE — 6370000000 HC RX 637 (ALT 250 FOR IP): Performed by: STUDENT IN AN ORGANIZED HEALTH CARE EDUCATION/TRAINING PROGRAM

## 2023-07-28 PROCEDURE — 1100000000 HC RM PRIVATE

## 2023-07-28 RX ADMIN — ACETAMINOPHEN 1000 MG: 500 TABLET, FILM COATED ORAL at 05:59

## 2023-07-28 RX ADMIN — IBUPROFEN 800 MG: 800 TABLET ORAL at 18:43

## 2023-07-28 RX ADMIN — IBUPROFEN 800 MG: 800 TABLET ORAL at 10:21

## 2023-07-28 RX ADMIN — ACETAMINOPHEN 1000 MG: 500 TABLET, FILM COATED ORAL at 15:19

## 2023-07-28 RX ADMIN — POLYETHYLENE GLYCOL 3350 17 G: 17 POWDER, FOR SOLUTION ORAL at 10:21

## 2023-07-28 NOTE — CARE COORDINATION
Chart reviewed - first time parent. SW met with patient/ to complete initial assessment. Patient states that  will not be added to her [facility]. Verbal education/pamphlet left on support thru [employer]. Additionally,  contacted Jasmyne Montgomery with [employer] and requested that she reach out to family to assist with Medicaid/Financial Assistance application.  provided education on [facility] [place] Home Visit Program.  Family was undecided on need for home visit. No referral will be made at this time. Family has this 's contact information should they decide to participate in program.    Patient given informational packet on  mood & anxiety disorders (resources/education). Family denies any additional needs from  at this time. Family has 's contact information should any needs/questions arise.     KASSANDRA Wan, [address] [place] [address]   [phone]

## 2023-07-28 NOTE — OP NOTE
Delivery Note    Obstetrician:  Teodora Echols MD    Assistant: none    Pre-Delivery Diagnosis: Term pregnancy, Induced labor, Single fetus, and Pregnancy complicated by: gestational diabetes    Post-Delivery Diagnosis: Living  infant(s)    Intrapartum Event: None    Procedure: Spontaneous vaginal delivery    Epidural: yes    Monitor:  Fetal Heart Tones - External and Uterine Contractions - External    Indications for instrumental delivery: none    Estimated Blood Loss: see QBL    Episiotomy: n/a    Laceration(s):  2nd degree and labial    Laceration(s) repair: 2nd degree in usual fashion, labial abrasions reapproximated with interrupted sutures of 3-0 vicryl    Presentation: Cephalic    Fetal Description: casiano    Fetal Position: Occiput Anterior    Birth Weight: 6#15    Apgar - One Minute: 8    Apgar - Five Minutes: 9    Umbilical Cord: 3 vessels present  Specimens: none  Complications:  none           Cord Blood Results:   Information for the patient's :  Stacy Pedersen [[id number]]   No results found for: PCTABR, PCTDIG, BILI   Prenatal Labs:   No results found for: PCTABR     Attending Attestation: I performed the procedure. Head to  in JAMES position, restituted LOT. Right anterior shoulder delivered with gentle downward traction, posterior shoulder and body followed with ease. Infant placed on maternal abdomen for routine NRP. Cord clamped x2 at 2 mins of life after pulsations ceased and cut by FOB. Cord blood obatined. Pit running for active 3rd stage mgmt. Placenta deliverd intact with gentle cord traction and suprapubic pressure. ILSA swept of small clot. Fundus firm with massage. 2nd degree laceration repaired in the usual fashion and a series of interrupted 3-0 vicryl sutures placed for reapproximation and pt comfort. All laps, sharps, and instrument counts correct.

## 2023-07-28 NOTE — LACTATION NOTE

## 2023-07-28 NOTE — LACTATION NOTE
This note was copied from a baby's chart. In to see mom and infant for the first time. Assisted mom with lathing infant on her left breast in the cross cradle hold. After several attempts infant latched and started to suck rhythmically and did so for several minutes. Reviewed with mom and dad the expectations of the first 24 hours and the need to offer infant the breast at least every 3 hours. Assisted mom with pumping and she expressed 2.5 ml colostrum which she fed to infant with curve tip syringe while infant sucked on yg finger. Reviewed the second night of life. Lactation consultant will follow up tomorrow.

## 2023-07-28 NOTE — L&D DELIVERY NOTE
Donaldlisa Breezy Girl Crystal De Santiago [[id number]]      Labor Events     Labor: No   Steroids: None  Cervical Ripening Date/Time:  23 20:09:00   Cervical Ripening Type: Misoprostol  Antibiotics Received during Labor: Yes  Rupture Date/Time:  23 12:10:00   Rupture Type: SROM  Fluid Color: Clear  Fluid Odor: None  Fluid Volume:  Moderate  Induction: Misoprostol  Augmentation: None, Oxytocin  Labor Complications: None              Anesthesia    Method: Epidural       Labor Event Times      Labor onset date/time:  23 20:09:00     Dilation complete date/time:  23 17:50:00 EDT     Start pushing date/time:  2023 19:24:00   Decision date/time (emergent ):            Delivery Details      Delivery Date: 23 Delivery Time: 19:32:00   Delivery Type: Vaginal, Spontaneous              [place] Presentation    Presentation: Vertex  _: Occiput  _: Anterior       Shoulder Dystocia    Shoulder Dystocia Present?: No       Assisted Delivery Details    Forceps Attempted?: No  Vacuum Extractor Attempted?: No                           Cord    Vessels: 3 Vessels  Complications: None  Delayed Cord Clamping?: Yes  Cord Blood Disposition: Lab  Gases Sent?: No              Placenta    Date/Time: 2023 19:39:26  Removal: Spontaneous  Appearance: Intact  Disposition: Discarded       Lacerations    Episiotomy: None  Perineal Lacerations: 2nd  Other Lacerations: labial laceration  Labial Laceration: right Repaired?: Yes   Number of Repair Packets: 2       Vaginal Counts    Initial Count Personnel: Vinita Baker  Initial Count Verified By: Bulmaro Garza RN  Intial Sponge Count: Correct Intial Needles Count: Correct Intial Instruments Count: Correct   Final Count Personnel: Vinita Baker  Final Count Verified By: Bulmaro Garza RN  Accurate Final Count?: Yes       Blood Loss  Mother: Ann Cruz #[id number]     Start of Mother's Information      Delivery Blood Loss  23 - 23      None

## 2023-07-29 VITALS
HEART RATE: 86 BPM | RESPIRATION RATE: 18 BRPM | TEMPERATURE: 98.8 F | DIASTOLIC BLOOD PRESSURE: 75 MMHG | BODY MASS INDEX: 36.22 KG/M2 | WEIGHT: 211 LBS | OXYGEN SATURATION: 96 % | SYSTOLIC BLOOD PRESSURE: 124 MMHG

## 2023-07-29 PROBLEM — Z34.90 ENCOUNTER FOR INDUCTION OF LABOR: Status: RESOLVED | Noted: 2023-07-26 | Resolved: 2023-07-29

## 2023-07-29 PROBLEM — O99.413 MATERNAL ARRHYTHMIA COMPLICATING PREGNANCY IN THIRD TRIMESTER: Status: RESOLVED | Noted: 2023-06-05 | Resolved: 2023-07-29

## 2023-07-29 PROBLEM — O09.93 HIGH-RISK PREGNANCY IN THIRD TRIMESTER: Status: RESOLVED | Noted: 2023-05-30 | Resolved: 2023-07-29

## 2023-07-29 PROBLEM — I49.9 MATERNAL ARRHYTHMIA COMPLICATING PREGNANCY IN THIRD TRIMESTER: Status: RESOLVED | Noted: 2023-06-05 | Resolved: 2023-07-29

## 2023-07-29 PROBLEM — J45.909 ASTHMA DURING PREGNANCY: Status: RESOLVED | Noted: 2023-01-09 | Resolved: 2023-07-29

## 2023-07-29 PROBLEM — O99.519 ASTHMA DURING PREGNANCY: Status: RESOLVED | Noted: 2023-01-09 | Resolved: 2023-07-29

## 2023-07-29 PROBLEM — O99.213 OBESITY AFFECTING PREGNANCY IN THIRD TRIMESTER: Status: RESOLVED | Noted: 2023-05-30 | Resolved: 2023-07-29

## 2023-07-29 PROCEDURE — 6370000000 HC RX 637 (ALT 250 FOR IP): Performed by: STUDENT IN AN ORGANIZED HEALTH CARE EDUCATION/TRAINING PROGRAM

## 2023-07-29 RX ORDER — IBUPROFEN 800 MG/1
800 TABLET ORAL EVERY 6 HOURS
Qty: 120 TABLET | Refills: 3 | Status: SHIPPED | OUTPATIENT
Start: 2023-07-29

## 2023-07-29 RX ADMIN — IBUPROFEN 800 MG: 800 TABLET ORAL at 06:24

## 2023-07-29 RX ADMIN — Medication: at 16:06

## 2023-07-29 RX ADMIN — ACETAMINOPHEN 1000 MG: 500 TABLET, FILM COATED ORAL at 10:42

## 2023-07-29 RX ADMIN — IBUPROFEN 800 MG: 800 TABLET ORAL at 15:47

## 2023-07-29 RX ADMIN — IBUPROFEN 800 MG: 800 TABLET ORAL at 00:34

## 2023-07-29 RX ADMIN — WITCH HAZEL: 500 SOLUTION RECTAL; TOPICAL at 16:07

## 2023-07-29 RX ADMIN — POLYETHYLENE GLYCOL 3350 17 G: 17 POWDER, FOR SOLUTION ORAL at 10:42

## 2023-07-29 NOTE — LACTATION NOTE
This note was copied from a baby's chart. Lactation visit. Baby down 9.5% from birthweight, has not had a stool diaper since 2000 last night and no ped visit until Tuesday afternoon for follow up. Is latching ok, some feeds short and did not feed well consistently overnight. Has pumped some but again not consistently. Discussed feeding needs and expectations. Feed every 3 hours. Must wake baby to feed. Due to weight loss and lack of output, formula supplementation suggested by night RN, mom declined. Discussed that baby medically may need formula supplementation today. Feed every 3 hours, breastfeed and then immediately pump also x 15 min following all feeds. Feed back all colostrum. If baby has not had another stool by 2000 or is lacking urine output, would need to immediately give formula as possible dehydration risk. Discussed giving 10-15ml via syringe. Formula given if needed for home use. Mom has pump for home use. Lactation will call tomorrow or Monday to check in as no ped visit until Tuesday afternoon per RN.

## 2023-07-29 NOTE — DISCHARGE SUMMARY
Obstetrical Discharge Summary     Name: Nirali Blackwell  MRN: [id number]   SSN: Salma Nap     YOB: 2000   Age: 21 y.o. Sex: female       Allergies: No Known Allergies     Admit Date:  2023     Discharge Date:   2023    Admitting Physician: Josué    Attending Physician:  Josué    * Admission Diagnoses:  Encounter for induction of labor [Z34.90]     * Discharge Diagnoses:     Erbita Jones [[id number]]       Information    Head delivery date/time: 2023 19:32:00   Changing the 's delivery date/time could affect patient care.:      Delivery date/time:  23   Delivery type: Vaginal, Spontaneous    Details: Additional Diagnoses: Active Problems:    Normal vaginal delivery of first pregnancy  Plan: Routine PP care          There is no immunization history for the selected administration types on file for this patient. * Procedures: No admission procedures for hospital encounter. * Discharge Condition: Good    [facility] Hospital Course: Normal hospital course following the delivery. * Disposition: Home    Discharge Medications:      Medication List        START taking these medications      ibuprofen 800 MG tablet  Commonly known as: ADVIL;MOTRIN  Take 1 tablet by mouth in the morning and 1 tablet at noon and 1 tablet in the evening and 1 tablet before bedtime. CONTINUE taking these medications      MISC NATURAL PRODUCTS PO     PRENATAL 1+1 PO               Where to Get Your Medications        These medications were sent to [facility] [id number] - [address]  [address]      Phone: [phone]   ibuprofen 800 MG tablet          * Follow-up Care/Patient Instructions:   Activity: no sex for 6 weeks and no heavy lifting for 6 weeks  Diet: regular diet  Wound Care: keep wound clean and dry and as directed    Follow-up

## 2023-07-29 NOTE — LACTATION NOTE

## 2023-07-29 NOTE — DISCHARGE INSTRUCTIONS
Discharge instruction to follow: Activity: Pelvis rest for 6 weeks     No heavy lifting over 15 lbs for 2 weeks     No driving for 2 weeks     No push/pull motion such as sweeping or vacuuming for 2 weeks     No tub baths for 6 weeks    If using sitz bath continue until comfortable stopping. If using liliana-bottle continue to use until comfortable stopping. Change sanitary pad after each urination or bowel movement. Call MD for the following:      Fever over 101 F; pain not relieved by medication; foul smelling vaginal discharge or an increase in vaginal bleeding. Take medication as prescribed. Follow up with MD as ordered. Vaginal Childbirth: Care Instructions  Overview     Vaginal birth means delivering a baby through the birth canal (vagina). During labor, the uterus tightens (contracts) regularly to thin and open the cervix and to push the baby out through the birth canal.  Your body will slowly heal in the next few weeks. It's easy to get too tired and overwhelmed during the first weeks after your baby is born. Changes in your hormones can shift your mood without warning. You may find it hard to meet the extra demands on your energy and time. Take it easy on yourself. Follow-up care is a key part of your treatment and safety. Be sure to make and go to all appointments, and call your doctor if you are having problems. It's also a good idea to know your test results and keep a list of the medicines you take. How can you care for yourself at home? Vaginal bleeding and cramps  After delivery, you will have a bloody discharge from your vagina. This will turn pink within a week and then white or yellow after about 10 days. It may last for 2 to 4 weeks or longer, until the uterus has healed. Use sanitary pads until you stop bleeding. Using pads makes it easier to monitor your bleeding. Don't worry if you pass some blood clots, as long as they are smaller than a golf ball.  If you have a tear or

## 2023-08-09 ENCOUNTER — FOLLOWUP TELEPHONE ENCOUNTER (OUTPATIENT)
Dept: MOTHER INFANT UNIT | Age: 23
End: 2023-08-09

## 2023-08-09 NOTE — TELEPHONE ENCOUNTER
Patient called because she has a red area on her right  breast about the size of a quarter that is red, tender and warm. She stated that her milk is coming in and is still going through the engorgement phase. Reviewed with her that it is most likely a plugged duct and reviewed with her the measures to break up and relieve the discomfort. Informed her that it could take a couple of days for it to resolve completely. Also instructed her to call her primary care physician if she starts to have a fever or feels like she has the \"flu\". Informed her that it could be Mastitis and she would need antibiotics for that. Mom to follow up with lactation consultant as needed.